# Patient Record
Sex: MALE
[De-identification: names, ages, dates, MRNs, and addresses within clinical notes are randomized per-mention and may not be internally consistent; named-entity substitution may affect disease eponyms.]

---

## 2018-10-20 ENCOUNTER — HOSPITAL ENCOUNTER (EMERGENCY)
Dept: HOSPITAL 42 - ED | Age: 43
LOS: 1 days | Discharge: HOME | End: 2018-10-21
Payer: SELF-PAY

## 2018-10-20 VITALS — TEMPERATURE: 98.4 F

## 2018-10-20 VITALS — BODY MASS INDEX: 42.3 KG/M2

## 2018-10-20 DIAGNOSIS — F17.210: ICD-10-CM

## 2018-10-20 DIAGNOSIS — J98.01: ICD-10-CM

## 2018-10-20 DIAGNOSIS — J40: Primary | ICD-10-CM

## 2018-10-20 PROCEDURE — 85378 FIBRIN DEGRADE SEMIQUANT: CPT

## 2018-10-20 PROCEDURE — 83735 ASSAY OF MAGNESIUM: CPT

## 2018-10-20 PROCEDURE — 93005 ELECTROCARDIOGRAM TRACING: CPT

## 2018-10-20 PROCEDURE — 80053 COMPREHEN METABOLIC PANEL: CPT

## 2018-10-20 PROCEDURE — 87804 INFLUENZA ASSAY W/OPTIC: CPT

## 2018-10-20 PROCEDURE — 71275 CT ANGIOGRAPHY CHEST: CPT

## 2018-10-20 PROCEDURE — 99284 EMERGENCY DEPT VISIT MOD MDM: CPT

## 2018-10-20 PROCEDURE — 85025 COMPLETE CBC W/AUTO DIFF WBC: CPT

## 2018-10-20 PROCEDURE — 96374 THER/PROPH/DIAG INJ IV PUSH: CPT

## 2018-10-20 PROCEDURE — 71045 X-RAY EXAM CHEST 1 VIEW: CPT

## 2018-10-20 RX ADMIN — IPRATROPIUM BROMIDE AND ALBUTEROL SULFATE SCH ML: .5; 3 SOLUTION RESPIRATORY (INHALATION) at 23:55

## 2018-10-20 RX ADMIN — IPRATROPIUM BROMIDE AND ALBUTEROL SULFATE SCH ML: .5; 3 SOLUTION RESPIRATORY (INHALATION) at 23:40

## 2018-10-20 RX ADMIN — IPRATROPIUM BROMIDE AND ALBUTEROL SULFATE SCH ML: .5; 3 SOLUTION RESPIRATORY (INHALATION) at 23:59

## 2018-10-20 NOTE — ED PDOC
Arrival/HPI





- General


Historian: Patient





- History of Present Illness


Narrative History of Present Illness (Text): 





10/20/18 23:38


41 y/o male, pmh including hypothyroidism, nkda, c/o coughing and shortness of 

breath x 6 days.  Pt. stated that she had rt. knee surgery about 2 weeks ago, 1 

week later started to have have coughing and shortness of breath, stated that he

has of pneumonia, no fever or chills, no night sweat, no dizziness, no change in

vision, no other medical or psychological complaints. 





<Trey Jaramillo - Last Filed: 10/21/18 01:50>





<Jose Aponte - Last Filed: 10/21/18 03:16>





- General


Chief Complaint: Shortness Of Breath


Time Seen by Provider: 10/20/18 23:38





Past Medical History





- Provider Review


Nursing Documentation Reviewed: Yes





- Pulmonary


Hx Pneumonia: Yes





- Endocrine/Metabolic


Hx Hypothyroidism: Yes





- Musculoskeletal/Rheumatological


Other/Comment: Right Knee Injury.  Right Hip Problem.  Right Wrist Cyst





- Psychiatric


Hx Substance Use: No





- Surgical History


Other/Comment: Right Hip Replacement.  Right Knee Surgery.  Cyst Removal on the 

Right Wrist.





<Trey Jaramillo - Last Filed: 10/21/18 01:50>





Family/Social History





- Physician Review


Nursing Documentation Reviewed: Yes


Family/Social History: Unknown Family HX


Smoking Status: Heavy Smoker > 10 Cigarettes Daily


Hx Alcohol Use: Yes


Frequency of alcohol use: Socially


Hx Substance Use: No





<Trey Jaramillo - Last Filed: 10/21/18 01:50>





Allergies/Home Meds





<Trey Jaramillo - Last Filed: 10/21/18 01:50>





<Jose Aponte - Last Filed: 10/21/18 03:16>


Allergies/Adverse Reactions: 


Allergies





No Known Allergies Allergy (Verified 10/20/18 23:31)


   








Home Medications: 


                                    Home Meds











 Medication  Instructions  Recorded  Confirmed


 


Levothyroxine Sodium [Synthroid] 250 mcg PO DAILY 10/20/18 10/20/18














Review of Systems





- Review of Systems


Constitutional: absent: Fatigue, Fevers


Eyes: absent: Vision Changes


ENT: absent: Hearing Changes


Respiratory: SOB, Cough, Sputum, Wheezing


Cardiovascular: absent: Chest Pain, Palpitations


Gastrointestinal: absent: Abdominal Pain, Nausea, Vomiting


Musculoskeletal: absent: Arthralgias, Back Pain


Skin: absent: Rash, Pruritis


Neurological: absent: Headache, Dizziness


Psychiatric: absent: Anxiety, Depression, Suicidal Ideation





<Trey Jaramillo - Last Filed: 10/21/18 01:50>





Physical Exam


Vital Signs Reviewed: Yes





Vital Signs











  Temp Pulse Resp BP Pulse Ox


 


 10/20/18 23:31  98.4 F  83  20  155/96 H  95











Temperature: Afebrile


Blood Pressure: Hypertensive


Pulse: Regular


Respiratory Rate: Normal


Appearance: Positive for: Well-Appearing, Non-Toxic, Comfortable


Pain Distress: None


Mental Status: Positive for: Alert and Oriented X 3





- Systems Exam


Head: Present: Atraumatic, Normocephalic


Pupils: Present: PERRL


Extroacular Muscles: Present: EOMI


Conjunctiva: Present: Normal


Mouth: Present: Moist Mucous Membranes


Neck: Present: Normal Range of Motion


Respiratory/Chest: Present: Wheezes, Decreased Breath Sounds, Rales, Rhonchi.  

No: Respiratory Distress, Accessory Muscle Use, Retracting, Tachypneic, Tender 

to Palpation


Cardiovascular: Present: Regular Rate and Rhythm, Normal S1, S2.  No: Murmurs


Abdomen: No: Tenderness, Distention, Peritoneal Signs, Rebound, Guarding


Back: Present: Normal Inspection


Upper Extremity: Present: Normal Inspection.  No: Cyanosis, Edema


Lower Extremity: Present: Normal Inspection.  No: Edema


Neurological: Present: GCS=15, CN II-XII Intact, Speech Normal, Motor Func 

Grossly Intact, Gait Normal, Memory Normal


Skin: Present: Warm, Dry, Normal Color.  No: Rashes


Psychiatric: Present: Alert, Oriented x 3, Normal Insight, Normal Concentration





<Trey Jaramillo - Last Filed: 10/21/18 01:50>





Vital Signs











  Temp Pulse Resp BP Pulse Ox


 


 10/20/18 23:31  98.4 F  83  20  155/96 H  95














<Jose Aponte - Last Filed: 10/21/18 03:16>





Medical Decision Making


ED Course and Treatment: 





10/20/18 23:51


[place] vs. Bronchitis vs. PE


-Labs/rapid flu


-Chest xray


-IV solumedrol/duoneb


-Observe and reassess





10/21/18 01:50


-EKG: NSR @ 82 BPM, no ST elevation or depression, no T wave inversion. 


-Chest xray:  ER wet read: no active disease


-Labs show no acute findings except wbc 13.1 (likely stress induced, afebrile). 


-Dimer 1026, CTA ordered


-Case discussed and endorsed to the current ER attending DR. Aponte, he 

would follow up the CTA and dispo the patient. 





- EKG Interpretation


EKG Interpretation (Text): 





10/21/18 00:46


-EKG: NSR @ 82 BPM, no ST elevation or depression, no T wave inversion. 


Interpreted by ED Physician: Yes


Type: 12 lead EKG





<Trey Jaramillo Q - Last Filed: 10/21/18 01:50>


ED Course and Treatment: 





EXAM: CTA OF THE CHEST WITH IV CONTRAST 


Electronically signed on Oct 21, 2018 2:20:05 AM EDT by:


Kobe Talbot M.D


IMPRESSION:


No demonstrated pulmonary embolism or arterial dissection.





10/21/18 03:01


On re-evaluation, patient is in no acute distress. I have discussed the results 

and plan with the patient, who expresses understanding. Patient in agreement 

with plan to be discharged home. Patient is stable for discharge. Patient was 

instructed to follow up with physician or return if symptoms worsen or new 

concerning symptoms arise. 





- Lab Interpretations


Lab Results: 











                                 10/20/18 23:45 





                                   Lab Results





10/20/18 23:45: Sodium Pending, Potassium Pending, Chloride Pending, Carbon 

Dioxide Pending, Anion Gap Pending, BUN 19, Creatinine 1.0, Est GFR ( 

Amer) > 60, Est GFR (Non-Af Amer) > 60, Random Glucose 167 H, Calcium 9.4, 

Magnesium Pending, Total Bilirubin 0.5, AST Pending, ALT Pending, Alkaline 

Phosphatase Pending, Total Protein 8.2, Albumin 4.3, Globulin 3.9, 

Albumin/Globulin Ratio 1.1











- RAD Interpretation


Radiology Orders: 











10/20/18 23:47


CHEST PORTABLE [RAD] Stat 














- Medication Orders


Current Medication Orders: 











Albuterol/Ipratropium (Duoneb 3 Mg/0.5 Mg (3 Ml) Ud)  3 ml IH Q15M DEVENDRA


   Stop: 10/21/18 00:16


   Last Admin: 10/20/18 23:59  Dose: 3 ml





Discontinued Medications





Methylprednisolone (Solu-Medrol)  125 mg IVP STAT STA


   Stop: 10/20/18 23:48


   Last Admin: 10/20/18 23:58  Dose: 125 mg





IVP Administration


 Document     10/20/18 23:58  MARY  (Rec: 10/20/18 23:58  [facility]  [id number])


     Charges for Administration


      # of IVP Administrations                   1














<Jose Aponte - Last Filed: 10/21/18 03:16>





- PA / NP / Resident Statement


JANETT has reviewed & agrees with the documentation as recorded.


JANETT has examined the patient and agrees with the treatment plan.





<Trey Jaramillo - Last Filed: 10/21/18 01:50>





- PA / NP / Resident Statement


JANETT has reviewed & agrees with the documentation as recorded.


MD/ has examined the patient and agrees with the treatment plan.





- Scribe Statement


The provider has reviewed the documentation as recorded by the Hugo Cotter





Provider Scribe Attestation:


All medical record entries made by the Scribe were at my direction and persona

lly dictated by me. I have reviewed the chart and agree that the record 

accurately reflects my personal performance of the history, physical exam, 

medical decision making, and the department course for this patient. I have also

 personally directed, reviewed, and agree with the discharge instructions and 

disposition.








<Jose Aponte - Last Filed: 10/21/18 03:16>





Disposition/Present on Arrival





- Present on Arrival


Any Indicators Present on Arrival: No


History of DVT/PE: No


History of Uncontrolled Diabetes: No


Urinary Catheter: No


History of Decub. Ulcer: No


History Surgical Site Infection Following: None





- Disposition


Have Diagnosis and Disposition been Completed?: Yes


Disposition Time: 01:51





<Trey Jaramillo - Last Filed: 10/21/18 01:50>





- Present on Arrival


Any Indicators Present on Arrival: No





- Disposition


Have Diagnosis and Disposition been Completed?: Yes


Disposition Time: 02:55


Patient Plan: Discharge





<Jose Aponte - Last Filed: 10/21/18 03:16>





- Disposition


Diagnosis: 


 Bronchitis, Bronchospasm





Disposition: HOME/ ROUTINE


Condition: STABLE


Discharge Instructions (ExitCare):  Acute Bronchitis, Adult (DC)


Additional Instructions: 


Take meds as prescribed/stop smoking/follow up with your doctor this week


Prescriptions: 


predniSONE [Prednisone] 40 mg PO DAILY #10 tab


Benzonatate [Tessalon Perles] 100 mg PO TID PRN #21 sgl


 PRN Reason: Cough


RX: Albuterol HFA [Ventolin HFA 90 mcg/actuation (8 g)] 2 puff IH [id number] PRN #1

puff


 PRN Reason: Wheezing


Azithromycin [Z-Rashad] 250 mg PO DAILY #6 tab


Forms:  [employer] (English)

## 2018-10-21 VITALS — HEART RATE: 75 BPM | SYSTOLIC BLOOD PRESSURE: 138 MMHG | DIASTOLIC BLOOD PRESSURE: 88 MMHG | OXYGEN SATURATION: 96 %

## 2018-10-21 VITALS — RESPIRATION RATE: 18 BRPM

## 2018-10-21 LAB
ALBUMIN SERPL-MCNC: 4.3 G/DL (ref 3–4.8)
ALBUMIN/GLOB SERPL: 1.1 {RATIO} (ref 1.1–1.8)
ALT SERPL-CCNC: 44 U/L (ref 7–56)
AST SERPL-CCNC: 41 U/L (ref 17–59)
BASOPHILS # BLD AUTO: 0.11 K/MM3 (ref 0–2)
BASOPHILS NFR BLD: 0.8 % (ref 0–3)
BUN SERPL-MCNC: 19 MG/DL (ref 7–21)
CALCIUM SERPL-MCNC: 9.4 MG/DL (ref 8.4–10.5)
EOSINOPHIL # BLD: 0.5 10*3/UL (ref 0–0.7)
EOSINOPHIL NFR BLD: 3.8 % (ref 1.5–5)
ERYTHROCYTE [DISTWIDTH] IN BLOOD BY AUTOMATED COUNT: 15.2 % (ref 11.5–14.5)
GFR NON-AFRICAN AMERICAN: > 60
GRANULOCYTES # BLD: 4.64 10*3/UL (ref 1.4–6.5)
GRANULOCYTES NFR BLD: 35.5 % (ref 50–68)
HGB BLD-MCNC: 14.8 G/DL (ref 14–18)
LYMPHOCYTES # BLD: 7 10*3/UL (ref 1.2–3.4)
LYMPHOCYTES NFR BLD AUTO: 53.3 % (ref 22–35)
MCH RBC QN AUTO: 29.5 PG (ref 25–35)
MCHC RBC AUTO-ENTMCNC: 32.5 G/DL (ref 31–37)
MCV RBC AUTO: 90.6 FL (ref 80–105)
MONOCYTES # BLD AUTO: 0.9 10*3/UL (ref 0.1–0.6)
MONOCYTES NFR BLD: 6.6 % (ref 1–6)
PLATELET # BLD: 318 10^3/UL (ref 120–450)
PMV BLD AUTO: 8.8 FL (ref 7–11)
RBC # BLD AUTO: 5.02 10^6/UL (ref 3.5–6.1)
WBC # BLD AUTO: 13.1 10^3/UL (ref 4.5–11)

## 2018-10-21 NOTE — CT
Date of service: 



10/21/2018



PROCEDURE:  CT Chest with contrast (Pulmonary Angiogram)



HISTORY:

cough, sob, recent rt. knee surgery, r/o pe



COMPARISON:

None available.



TECHNIQUE:

Axial computed tomography images were obtained of the chest in the 

pulmonary arterial phase of enhancement. Coronal and sagittal 

reformatted images were created and reviewed.



Maximum intensity projection (MIP) reconstructed images in the 

following planes: Sagittal and coronal



Intravenous contrast dose: 100 cc Omnipaque 350.



Mean Hounsfield value in the main pulmonary artery:  149.09



Radiation dose:



Total exam DLP = 539.65 mGy-cm.



This CT exam was performed using one or more of the following dose 

reduction techniques: Automated exposure control, adjustment of the 

mA and/or kV according to patient size, and/or use of iterative 

reconstruction technique.



FINDINGS:



PULMONARY ARTERIES:

Unremarkable. No pulmonary embolism. 



AORTA:

No acute findings. No thoracic aortic aneurysm. No atherosclerotic 

calcification or mural plaque present.



LUNGS:

Pulmonary nodule lateral segment left lower lobe 5.6 mm. 



Additional pulmonary nodule same size basilar segment right lower 

lobe. 



PLEURAL SPACES:

Unremarkable. No effusion or pneumothorax. 



HEART:

Unremarkable. No cardiomegaly. No significant pericardial effusion. 



LYMPH NODES:

No lymphadenopathy.



BONES, CHEST WALL:

Unremarkable. No fracture or destructive lesion 



OTHER FINDINGS:

Unremarkable. 



Incomplete visualization hepatomegaly and hepatic steatosis. 



IMPRESSION:

No central or large pulmonary emboli identified.



Diagnostic value of the study based on opacification of the main 

pulmonary artery and qualitative assessment limits sensitivity and 

accuracy at and beyond the segmental branches.



There are multiple, solid, pulmonary nodules that are less than 6 mm 

in size. According to the 2017 Fleischner criteria, if the patient is 

low risk, no routine follow-up is recommended. If the patient is high 

risk, an optional CT at 12 months is recommended



________________________________________________



Concordant results (preliminary interpretation) provided by [facility].



Procedure Completed: 01:15.



Preliminary Report: Dictated and Authenticated: 02:20.



Final Interpretation: 09:57.

## 2018-10-21 NOTE — RAD
Date of service: 



10/21/2018



HISTORY:

 medical clearance 



COMPARISON:

October 21, 2018  Pulmonary angiogram 



FINDINGS:



LUNGS:

No active pulmonary disease.



PLEURA:

No significant pleural effusion identified, no pneumothorax apparent.



CARDIOVASCULAR:

No atherosclerotic calcification present



Normal.



OSSEOUS STRUCTURES:

No significant abnormalities.



VISUALIZED UPPER ABDOMEN:

Normal.



OTHER FINDINGS:

None.



IMPRESSION:

No active disease. 



___________________________________________________________



Concordant results with the preliminary interpretation rendered by 

the emergency department physician

procedure.

## 2018-10-21 NOTE — CARD
--------------- APPROVED REPORT --------------





Date of service: 10/20/2018



EKG Measurement

Heart [id number]

[id number]

[id number]

[id number]

[id number]



<Conclusion>

Normal sinus rhythm

Inferior-posterior infarct, age undetermined

Abnormal ECG